# Patient Record
Sex: MALE | Race: WHITE
[De-identification: names, ages, dates, MRNs, and addresses within clinical notes are randomized per-mention and may not be internally consistent; named-entity substitution may affect disease eponyms.]

---

## 2021-11-23 ENCOUNTER — HOSPITAL ENCOUNTER (EMERGENCY)
Dept: HOSPITAL 79 - ER1 | Age: 26
Discharge: HOME | End: 2021-11-23
Payer: COMMERCIAL

## 2021-11-23 VITALS — BODY MASS INDEX: 33.33 KG/M2 | HEIGHT: 69 IN | WEIGHT: 225 LBS

## 2021-11-23 DIAGNOSIS — U07.1: Primary | ICD-10-CM

## 2021-11-23 DIAGNOSIS — Z23: ICD-10-CM

## 2021-11-23 DIAGNOSIS — F17.210: ICD-10-CM

## 2021-11-23 DIAGNOSIS — J12.82: ICD-10-CM

## 2021-11-23 PROCEDURE — M0243 CASIRIVI AND IMDEVI INFUSION: HCPCS

## 2021-11-23 PROCEDURE — U0002 COVID-19 LAB TEST NON-CDC: HCPCS

## 2022-04-28 ENCOUNTER — OFFICE VISIT (OUTPATIENT)
Dept: UROLOGY | Facility: CLINIC | Age: 27
End: 2022-04-28

## 2022-04-28 VITALS — BODY MASS INDEX: 33.62 KG/M2 | HEIGHT: 69 IN | WEIGHT: 227 LBS

## 2022-04-28 DIAGNOSIS — Z98.52 VASECTOMY STATUS: Primary | ICD-10-CM

## 2022-04-28 PROCEDURE — 99203 OFFICE O/P NEW LOW 30 MIN: CPT | Performed by: UROLOGY

## 2022-04-28 RX ORDER — DIAZEPAM 10 MG/1
TABLET ORAL
Qty: 2 TABLET | Refills: 0 | Status: SHIPPED | OUTPATIENT
Start: 2022-04-28

## 2022-04-28 RX ORDER — CEPHALEXIN 500 MG/1
500 CAPSULE ORAL 2 TIMES DAILY
Qty: 8 CAPSULE | Refills: 0 | Status: SHIPPED | OUTPATIENT
Start: 2022-04-28 | End: 2022-05-02

## 2022-05-11 PROBLEM — Z98.52 VASECTOMY STATUS: Status: ACTIVE | Noted: 2022-05-11

## 2022-07-06 ENCOUNTER — TELEPHONE (OUTPATIENT)
Dept: UROLOGY | Facility: CLINIC | Age: 27
End: 2022-07-06

## 2022-07-07 ENCOUNTER — PROCEDURE VISIT (OUTPATIENT)
Dept: UROLOGY | Facility: CLINIC | Age: 27
End: 2022-07-07

## 2022-07-07 VITALS — HEIGHT: 69 IN | BODY MASS INDEX: 33.77 KG/M2 | WEIGHT: 228 LBS

## 2022-07-07 DIAGNOSIS — Z98.52 VASECTOMY STATUS: Primary | ICD-10-CM

## 2022-07-07 PROCEDURE — 55250 REMOVAL OF SPERM DUCT(S): CPT | Performed by: UROLOGY

## 2022-07-07 RX ORDER — HYDROCODONE BITARTRATE AND ACETAMINOPHEN 10; 325 MG/1; MG/1
1 TABLET ORAL EVERY 6 HOURS PRN
Qty: 10 TABLET | Refills: 0 | Status: SHIPPED | OUTPATIENT
Start: 2022-07-07

## 2022-07-07 NOTE — PATIENT INSTRUCTIONS
Vasectomy, Care After  Refer to this sheet in the next few weeks. These instructions provide you with information on caring for yourself after your procedure. Your health care provider may also give you more specific instructions. Your treatment has been planned according to current medical practices, but problems sometimes occur. Call your health care provider if you have any problems or questions after your procedure.    What can I expect after the procedure?  After your procedure, it is typical to have the following:  Slight swelling or redness or both at the surgical site.  Mild pain or discomfort in the scrotum.  Some oozing of blood from the cuts (incisions) made by the surgeon is normal during the first day or two after the procedure.  Blood in the ejaculate is common and typically clears after a few days.    Follow these instructions at home:  Only take over-the-counter or prescription medicines for pain, discomfort, or fever as directed by your health care provider.  Avoid using nonsteroidal anti-inflammatory drugs (NSAIDs) because these can make bleeding worse.  Apply ice to the injured area:  Put ice in a plastic bag.  Place a towel between your skin and the bag.  Leave the ice on for 20 minutes, 2-3 times a day.  Avoid being active for the first 2 days after surgery.  Wear a supporter while moving around for the first week after surgery. You may add some sterile fluffed bandages or a clean washcloth to the scrotal support if the scrotal support irritates your skin.  Do not participate in sports or perform heavy physical labor for at least 2 weeks.  You may have protected intercourse 7-10 days after your procedure. Remember, you are not sterile until follow-up specimens show no sperm in your ejaculate.  Be sure to follow up with your surgeon as instructed to confirm sterility. It usually requires multiple (20 - 30) ejaculations to clear the sperm located beyond the vasectomy site of blockage. You will  need at least two specimens showing an absence of sperm before you can resume unprotected intercourse.    Contact a health care provider if:  You have redness, swelling, or increasing pain in the wounds or testicles (scrotum).  You see pus coming from the wound.  You have a fever.  You notice a foul smell coming from the wound or dressing.  You notice a breaking open of the stitches (suture) line or wound edges even after sutures have been removed.  You have increased bleeding from the wounds.    Get help right away if:  You develop a rash.  You have difficulty breathing.  You have any reaction or side effects to medicines given.               Semen Analysis Lab Instructions - After Vasectomy     Follow these instructions at home:   4-6 weeks after vasectomy or approximately 20 - 30 ejaculations.   Semen specimen must be collected in sterile container provided by our office.  You must have your Name and Date of Birth legibly printed on the sterile container.  Sample must be dropped off at [facility] no more than 30 minutes after sample collected.     This information is not intended to replace advice given to you by your health care provider. Make sure you discuss any questions you have with your health care provider.  Document Released: 07/07/2006 Document Revised: 05/25/2017 Document Reviewed: 07/07/2014  Elsevier Interactive Patient Education © 2018 [employer]

## 2022-07-07 NOTE — PROGRESS NOTES
Chief Complaint:          Chief Complaint   Patient presents with   • Sterilization     Vasectomy        HPI:   27 y.o. male.  Vasectomy status-patient presents for consideration of an elective scrotal vasectomy.  I discussed the procedure at length.  I discussed the fact that it has risks of local anesthesia, bleeding, infection, testicular pain postoperatively shortly, and a very low chance of long-term testicular pain.  Discussed the rates of surgical complications such as a symptomatic hematoma and infection in the range of 1 to 2% and clinically from the risk of chronic significant scrotal pain in the range of 1 to 2%.  Discussed the failure rate in the range of 1 in 10,000 and the important necessity to have a follow-up in about 8 weeks after the original procedure to be sure that the semen specimen is free of spermatozoa, thus ensuring sterility.  I discussed the various techniques out there including a 1 incision, 2 incision technique as well.  They understand that we will be giving local anesthesia with Valium the night before and the morning of and a mild antibiotic to take in the immediate perioperative period.  If he cannot tolerate the anesthesia for a variety of reasons including body habitus., we are glad to perform it under an anesthetic.  We discussed the technique of reversal when indicated including the approximate rate of 57% and the importance that this is strongly related to the time from the original vasectomy.  However, I stressed the fact that if they are even considering children in the future they should not use this as a form of temporary contraception patients can stop using contraception 1 postvasectomy semen specimens show azoospermia or only rare nonmotile spermatozoa in the range of less than 100,000 sperm per mL        Past Medical History:      History reviewed. No pertinent past medical history.      Current Meds:     Current Outpatient Medications   Medication Sig Dispense Refill    • diazePAM (VALIUM) 10 MG tablet Use one tablet night before procedure at bedtime and morning of prior to procedure 2 tablet 0     No current facility-administered medications for this visit.        Allergies:      No Known Allergies     Past Surgical History:     History reviewed. No pertinent surgical history.      Social History:     Social History     Socioeconomic History   • Marital status: Single   Tobacco Use   • Smoking status: Never Smoker   • Smokeless tobacco: Never Used   Substance and Sexual Activity   • Alcohol use: Yes   • Drug use: Never   • Sexual activity: Yes     Partners: Female     Birth control/protection: Condom       Family History:     Family History   Problem Relation Age of Onset   • No Known Problems Father    • No Known Problems Mother        Review of Systems:     Review of Systems   Constitutional: Negative.    HENT: Negative.    Eyes: Negative.    Respiratory: Negative.    Cardiovascular: Negative.    Gastrointestinal: Negative.    Endocrine: Negative.    Musculoskeletal: Negative.    Allergic/Immunologic: Negative.    Neurological: Negative.    Hematological: Negative.    Psychiatric/Behavioral: Negative.        Physical Exam:     Physical Exam  Vitals and nursing note reviewed.   Constitutional:       Appearance: He is well-developed.   HENT:      Head: Normocephalic and atraumatic.   Eyes:      Conjunctiva/sclera: Conjunctivae normal.      Pupils: Pupils are equal, round, and reactive to light.   Cardiovascular:      Rate and Rhythm: Normal rate and regular rhythm.      Heart sounds: Normal heart sounds.   Pulmonary:      Effort: Pulmonary effort is normal.      Breath sounds: Normal breath sounds.   Abdominal:      General: Bowel sounds are normal.      Palpations: Abdomen is soft.   Genitourinary:     Penis: Normal.       Testes: Normal.   Musculoskeletal:         General: Normal range of motion.      Cervical back: Normal range of motion.   Skin:     General: Skin is warm and  dry.   Neurological:      Mental Status: He is alert and oriented to person, place, and time.      Deep Tendon Reflexes: Reflexes are normal and symmetric.   Psychiatric:         Behavior: Behavior normal.         Thought Content: Thought content normal.         Judgment: Judgment normal.         I have reviewed the following portions of the patient's history: Allergies, current medications, past family history, past medical history, past social history, past surgical history, problem list, and ROS and confirm it is accurate.      Recent Image (CT and/or KUB):      CT Abdomen and Pelvis: No results found for this or any previous visit.       CT Stone Protocol: No results found for this or any previous visit.       KUB: No results found for this or any previous visit.       Labs (past 3 months):      No visits with results within 3 Month(s) from this visit.   Latest known visit with results is:   No results found for any previous visit.        Procedure:     Elective scrotal vasectomy -After an appropriate informed consent including the risks of anesthesia, infection, scrotal hematoma, failure in the range of 1 in 10,000, chronic testalgia, low testosterone, as well as the other very rare complications identified.  He was brought to the operative suite.  His scrotum was shaved and prepped in a sterile fashion using Betadine.  Local anesthetic was infiltrated into the midline scrotal raphae.  A midline scrotal incision was made and the right vas entrapped.  I anesthetized the spermatic cord and skin using 5 mL of 1% Xylocaine with epinephrine after an adequate period of analgesia.  I identified the vas and brought out into the incision.  The fascia was divided.  The vas was then doubly ligated, fulgurated, and sewn in the sheath away from the proximal end.  Bovie electrocautery had been used to fulgurate the end of both vasa as per the AUA guidelines.  Hemostasis was excellent and it was allowed to fall back in the  scrotal sac.  The identical procedure was done on the contralateral side.  The skin was closed with a 3-0 chromic sutures.  Hemostasis was excellent.  He was recommended to use ice pack with a shield covering the scrotum to protect it from the ice.  He was given pain medication and antibiotics.  The incision was covered with bacitracin ointment.  The patient will be seen in 8 weeks whereupon we will then check a semen analysis to confirm the absence of spermatozoa and therby declare sterility.    Assessment/Plan:   Vasectomy status-patient presents for consideration of an elective scrotal vasectomy.  I discussed the procedure at length.  I discussed the fact that it has risks of local anesthesia, bleeding, infection, testicular pain postoperatively shortly, and a very low chance of long-term testicular pain.  Discussed the rates of surgical complications such as a symptomatic hematoma and infection in the range of 1 to 2% and clinically from the risk of chronic significant scrotal pain in the range of 1 to 2%.  Discussed the failure rate in the range of 1 in 10,000 and the important necessity to have a follow-up in about 8 weeks after the original procedure to be sure that the semen specimen is free of spermatozoa, thus ensuring sterility.  I discussed the various techniques out there including a 1 incision, 2 incision technique as well.  They understand that we will be giving local anesthesia with Valium the night before and the morning of and a mild antibiotic to take in the immediate perioperative period.  If he cannot tolerate the anesthesia for a variety of reasons including body habitus., we are glad to perform it under an anesthetic.  We discussed the technique of reversal when indicated including the approximate rate of 57% and the importance that this is strongly related to the time from the original vasectomy.  However, I stressed the fact that if they are even considering children in the future they  should not use this as a form of temporary contraception patients can stop using contraception 1 postvasectomy semen specimens show azoospermia or only rare nonmotile spermatozoa in the range of less than 100,000 sperm per mL  Narcotic pain medication-patient has significant acute pain that I believe would be an indication for the use of narcotic pain medication.  I discussed the significant risks of pain medication and the fact that this will be a short only option and I will give her no more than a three-day supply of pain medication, I will not plan long-term medication, and that this will be sent to a pain clinic if it at all becomes necessary.  We discussed signing a pain medication agreement and the fact that we're going to run a state [facility] review to be sure the patient is not getting pain medication from elsewhere.  If this is the case, we will not give pain medication as part of the patient's treatment plan of there being prescribed a controlled substance with potential for abuse.  This patient has been well aware of the appropriate dose of such medications including the risks for somnolence, limited ability to drive and/or safety and the significant potential for overdose.  It has been made clear that these medications are for the prescribed patient only without concomitant use of alcohol or other substance unless prescribed by the medical provider.  Has completed prescribing agreement detailing the terms of continue prescribing him a controlled substance including monitoring Nia reports, the possibility of urine drug screens, and pill counts.  The patient is aware that we review NIA reports on a regular basis and scan them into the chart.  History and physical examination exhibited continued safe and appropriate use of controlled substances. We also discussed the fact that the new [place] legislation allows only a three-day prescription for pain medication.  In this situation he will be referred  to a chronic pain clinic.              This document has been electronically signed by AMITA BLANCO MD July 7, 2022 08:06 EDT

## 2022-08-04 ENCOUNTER — LAB (OUTPATIENT)
Dept: UROLOGY | Facility: CLINIC | Age: 27
End: 2022-08-04

## 2022-08-04 DIAGNOSIS — Z98.52 VASECTOMY STATUS: Primary | ICD-10-CM

## 2022-08-04 LAB
MOTILITY - POST VASECTOMY: ABNORMAL
SPERM - POST VASECTOMY: ABNORMAL

## 2022-08-04 PROCEDURE — 89321 SEMEN ANAL SPERM DETECTION: CPT | Performed by: UROLOGY

## 2022-08-05 ENCOUNTER — TELEPHONE (OUTPATIENT)
Dept: UROLOGY | Facility: CLINIC | Age: 27
End: 2022-08-05

## 2022-08-05 NOTE — TELEPHONE ENCOUNTER
----- Message from Sammy Leyva MD sent at 8/5/2022  2:51 PM EDT -----  Still has a few active moral spermatozoa needs to recheck in about 6 weeks  ----- Message -----  From: Lab, Background User  Sent: 8/4/2022   4:29 PM EDT  To: Sammy Leyva MD

## 2022-08-05 NOTE — TELEPHONE ENCOUNTER
Called patient to go over his semen analysis that showed semen present. Dr bobo wants patient to recheck in 6 weeks. Patient verbalized understanding.

## 2022-10-03 ENCOUNTER — LAB (OUTPATIENT)
Dept: UROLOGY | Facility: CLINIC | Age: 27
End: 2022-10-03

## 2022-10-03 DIAGNOSIS — Z98.52 STATUS POST VASECTOMY: Primary | ICD-10-CM

## 2022-10-03 LAB — SPERM - POST VASECTOMY: NORMAL

## 2022-10-03 PROCEDURE — 89321 SEMEN ANAL SPERM DETECTION: CPT | Performed by: UROLOGY

## 2022-10-04 ENCOUNTER — TELEPHONE (OUTPATIENT)
Dept: UROLOGY | Facility: CLINIC | Age: 27
End: 2022-10-04

## 2022-10-04 NOTE — TELEPHONE ENCOUNTER
I called and notified the pt of his semen analysis report      ----- Message from Sammy Leyva MD sent at 10/4/2022  7:15 AM EDT -----  Notify no sperm seen  ----- Message -----  From: Lab, Background User  Sent: 10/3/2022   5:40 PM EDT  To: Sammy Leyva MD

## 2022-11-02 ENCOUNTER — TELEPHONE (OUTPATIENT)
Dept: UROLOGY | Facility: CLINIC | Age: 27
End: 2022-11-02

## 2022-11-02 NOTE — TELEPHONE ENCOUNTER
I called the pt with the below results    ----- Message from Sammy Leyva MD sent at 11/2/2022  7:23 AM EDT -----  Notify no sperm seen  ----- Message -----  From: Lab, Background User  Sent: 10/3/2022   5:40 PM EDT  To: Sammy Leyva MD